# Patient Record
Sex: MALE | Race: WHITE | NOT HISPANIC OR LATINO | Employment: PART TIME | URBAN - METROPOLITAN AREA
[De-identification: names, ages, dates, MRNs, and addresses within clinical notes are randomized per-mention and may not be internally consistent; named-entity substitution may affect disease eponyms.]

---

## 2017-10-09 ENCOUNTER — GENERIC CONVERSION - ENCOUNTER (OUTPATIENT)
Dept: OTHER | Facility: OTHER | Age: 47
End: 2017-10-09

## 2018-01-11 NOTE — MISCELLANEOUS
Provider Comments  Provider Comments:   CALLED PT FOR NOT SHOW, L/M TO RESCHEDULED          Signatures   Electronically signed by : RONI Harvey ; Nov 8 2017  2:30PM EST                       (Author)

## 2018-01-17 NOTE — MISCELLANEOUS
Provider Comments  Provider Comments:   CALLED PT FOR NOT SHOW, L/M TO RESCHEDULED          Signatures   Electronically signed by : RONI Olguin ; Nov 27 2017  9:17AM EST                       (Author)

## 2018-05-22 ENCOUNTER — OFFICE VISIT (OUTPATIENT)
Dept: FAMILY MEDICINE CLINIC | Facility: CLINIC | Age: 48
End: 2018-05-22

## 2018-05-22 VITALS
WEIGHT: 186 LBS | SYSTOLIC BLOOD PRESSURE: 128 MMHG | RESPIRATION RATE: 16 BRPM | DIASTOLIC BLOOD PRESSURE: 86 MMHG | HEART RATE: 72 BPM | BODY MASS INDEX: 29.89 KG/M2 | OXYGEN SATURATION: 97 % | HEIGHT: 66 IN

## 2018-05-22 DIAGNOSIS — L24.89 IRRITANT CONTACT DERMATITIS DUE TO OTHER AGENTS: Primary | ICD-10-CM

## 2018-05-22 PROCEDURE — 99213 OFFICE O/P EST LOW 20 MIN: CPT | Performed by: NURSE PRACTITIONER

## 2018-05-22 PROCEDURE — 96372 THER/PROPH/DIAG INJ SC/IM: CPT | Performed by: NURSE PRACTITIONER

## 2018-05-22 RX ORDER — METHYLPREDNISOLONE SODIUM SUCCINATE 125 MG/2ML
62.5 INJECTION, POWDER, LYOPHILIZED, FOR SOLUTION INTRAMUSCULAR; INTRAVENOUS ONCE
Status: COMPLETED | OUTPATIENT
Start: 2018-05-22 | End: 2018-05-22

## 2018-05-22 RX ORDER — METHYLPREDNISOLONE 4 MG/1
TABLET ORAL
Qty: 21 TABLET | Refills: 0 | Status: SHIPPED | OUTPATIENT
Start: 2018-05-22 | End: 2019-04-12

## 2018-05-22 RX ADMIN — METHYLPREDNISOLONE SODIUM SUCCINATE 62.5 MG: 125 INJECTION, POWDER, LYOPHILIZED, FOR SOLUTION INTRAMUSCULAR; INTRAVENOUS at 16:08

## 2018-05-22 NOTE — PROGRESS NOTES
Assessment/Plan:  1  Start pill tomorrow  2  Use hydrocortisone    3  Follow-up condition changes or worsens        Diagnoses and all orders for this visit:    Irritant contact dermatitis due to other agents  -     Methylprednisolone 4 MG TBPK; Use as directed on package  -     methylPREDNISolone sodium succinate (Solu-MEDROL) injection 62 5 mg; Inject 1 mL (62 5 mg total) into the shoulder, thigh, or buttocks once           Subjective:      Patient ID: Juvenal Crandall is a 57 y o  male  A 62-year-old male presents with poison ivy rash for about a week  Reports itchy  Tried some OTC Benadryl cream   Not helping  Not getting any better  The following portions of the patient's history were reviewed and updated as appropriate: allergies and current medications  Review of Systems   Constitutional: Negative  Skin: Positive for rash  Objective:      /86 (BP Location: Left arm, Patient Position: Sitting)   Pulse 72   Resp 16   Ht 5' 6" (1 676 m)   Wt 84 4 kg (186 lb)   SpO2 97%   BMI 30 02 kg/m²          Physical Exam   Constitutional: He appears well-developed and well-nourished  Skin: Rash noted     Contact dermatitis bilateral arms and chest

## 2019-04-12 ENCOUNTER — OFFICE VISIT (OUTPATIENT)
Dept: FAMILY MEDICINE CLINIC | Facility: CLINIC | Age: 49
End: 2019-04-12

## 2019-04-12 VITALS
OXYGEN SATURATION: 96 % | SYSTOLIC BLOOD PRESSURE: 126 MMHG | DIASTOLIC BLOOD PRESSURE: 90 MMHG | HEART RATE: 84 BPM | RESPIRATION RATE: 18 BRPM | TEMPERATURE: 97.1 F | WEIGHT: 190 LBS | BODY MASS INDEX: 30.67 KG/M2

## 2019-04-12 DIAGNOSIS — R53.83 FATIGUE, UNSPECIFIED TYPE: Primary | ICD-10-CM

## 2019-04-12 PROCEDURE — 99212 OFFICE O/P EST SF 10 MIN: CPT | Performed by: FAMILY MEDICINE

## 2019-04-12 RX ORDER — MULTIVITAMIN
1 TABLET ORAL DAILY
COMMUNITY

## 2019-04-24 ENCOUNTER — TREATMENT (OUTPATIENT)
Dept: FAMILY MEDICINE CLINIC | Facility: CLINIC | Age: 49
End: 2019-04-24

## 2019-04-26 VITALS
BODY MASS INDEX: 30.53 KG/M2 | DIASTOLIC BLOOD PRESSURE: 88 MMHG | SYSTOLIC BLOOD PRESSURE: 138 MMHG | HEIGHT: 66 IN | WEIGHT: 190 LBS

## 2019-08-06 ENCOUNTER — TELEPHONE (OUTPATIENT)
Dept: FAMILY MEDICINE CLINIC | Facility: CLINIC | Age: 49
End: 2019-08-06

## 2020-08-03 ENCOUNTER — OFFICE VISIT (OUTPATIENT)
Dept: FAMILY MEDICINE CLINIC | Facility: CLINIC | Age: 50
End: 2020-08-03

## 2020-08-03 VITALS
BODY MASS INDEX: 29.59 KG/M2 | WEIGHT: 177.6 LBS | TEMPERATURE: 98.6 F | HEART RATE: 81 BPM | SYSTOLIC BLOOD PRESSURE: 134 MMHG | OXYGEN SATURATION: 97 % | DIASTOLIC BLOOD PRESSURE: 94 MMHG | HEIGHT: 65 IN

## 2020-08-03 DIAGNOSIS — K59.01 SLOW TRANSIT CONSTIPATION: ICD-10-CM

## 2020-08-03 DIAGNOSIS — H90.6 MIXED CONDUCTIVE AND SENSORINEURAL HEARING LOSS OF BOTH EARS: Primary | ICD-10-CM

## 2020-08-03 DIAGNOSIS — E66.3 OVERWEIGHT WITH BODY MASS INDEX (BMI) OF 29 TO 29.9 IN ADULT: ICD-10-CM

## 2020-08-03 PROCEDURE — 99213 OFFICE O/P EST LOW 20 MIN: CPT | Performed by: FAMILY MEDICINE

## 2020-08-03 PROCEDURE — 1036F TOBACCO NON-USER: CPT | Performed by: FAMILY MEDICINE

## 2020-08-03 PROCEDURE — 3008F BODY MASS INDEX DOCD: CPT | Performed by: FAMILY MEDICINE

## 2020-08-03 NOTE — PROGRESS NOTES
Assessment/Plan:    52year old male with hearing loss  Diagnoses and all orders for this visit:    Mixed conductive and sensorineural hearing loss of both ears  -     Ambulatory referral to Audiology; Future    Slow transit constipation      1  Referral for ambulatory audiology services to diagnose hearing loss  Return to the clinic with the results  2  Consume fruits and vegetables rich in fiber for the slow transit constipation  Try OTC fiber products  Subjective:      Patient ID: Oracio Liao is a 52 y o  male  52year old male comes in for evaluation of hearing loss  His girlfriend was present during the evaluation  Patient expresses that he has had hearing loss since he was 15or 14years old  He refers that at that age he remembers having really bad ear infections  He refers that he is in the process of obtaining his citizenship and needs to take a test but refers that his hearing loss problems is an obstacle towards him learning english and from obtaining naturalization  Patient also refers constipation, straining to pass stool, passes stool every 2-3 days  No blood in stool  Also refers some diarrhea from time to time  The following portions of the patient's history were reviewed and updated as appropriate: current medications, past family history, past medical history, past social history, past surgical history and problem list     Review of Systems   Gastrointestinal: Positive for constipation (passes stool every 2-3 days, has to strain)  Objective:      /94 (BP Location: Right arm, Patient Position: Sitting, Cuff Size: Large)   Pulse 81   Temp 98 6 °F (37 °C) (Tympanic)   Ht 5' 5"   Wt 80 6 kg (177 lb 9 6 oz)   SpO2 97%   BMI 29 55 kg/m²          Physical Exam   HENT:   Head: Normocephalic and atraumatic  Right Ear: Tympanic membrane is scarred  Left Ear: Tympanic membrane is scarred     Mouth/Throat: Mucous membranes are moist  No posterior oropharyngeal Called patient. Left voicemail to return call to clinic    Maile Ortiz, RN  Triage Nurse     erythema  Oropharynx is clear  Cardiovascular: Normal rate and regular rhythm  Pulmonary/Chest: Effort normal and breath sounds normal  No stridor  No respiratory distress  Abdominal: Bowel sounds are normal  There is abdominal tenderness in the right lower quadrant  Neurological: He is alert  Vitals reviewed

## 2020-09-01 ENCOUNTER — OFFICE VISIT (OUTPATIENT)
Dept: AUDIOLOGY | Facility: CLINIC | Age: 50
End: 2020-09-01
Payer: COMMERCIAL

## 2020-09-01 DIAGNOSIS — H90.3 SENSORINEURAL HEARING LOSS, BILATERAL: Primary | ICD-10-CM

## 2020-09-01 DIAGNOSIS — H90.6 MIXED CONDUCTIVE AND SENSORINEURAL HEARING LOSS OF BOTH EARS: ICD-10-CM

## 2020-09-01 PROCEDURE — 92567 TYMPANOMETRY: CPT | Performed by: AUDIOLOGIST

## 2020-09-01 PROCEDURE — 92557 COMPREHENSIVE HEARING TEST: CPT | Performed by: AUDIOLOGIST

## 2020-09-01 NOTE — PROGRESS NOTES
HEARING EVALUATION    Name:  Humza Huitron  :  1970  Age:  52 y o  Date of Evaluation: 20     History:  Hearing impairment   Reason for visit: Humza Huitron is being seen today at the request of Dr Donna Turner for an evaluation of hearing  Via discussion with his wife and [employer] interpretation, the patient reports long term hearing loss  His wife describes that he also has a learning disability as well as hearing loss and is unable to write in [place] or [place]  He is looking to become a [place] citizen and they are assessing his hearing in order to attempt to obtain assistance on the naturalization process  EVALUATION:    Otoscopic Evaluation:   Right Ear: clear canal    Left Ear: clear canal     Tympanometry:   Right: Type A - normal middle ear pressure and compliance   Left: Type A - normal middle ear pressure and compliance    Audiogram Results:  Mild / moderate SNHL bilaterally  Word recognition scores were not obtained due to language barrier  *see attached audiogram    Discussed results with the patient and his wife via [employer] (speaker) phone interpretation  They were both in understanding of the test results and the below recommendations  RECOMMENDATIONS:  1  Consider hearing aid evaluation and trial    Wife discussed her financial concerns regarding this process  Our typical avenues for low income HA assistance have been unavailable due to Covid reductions ([employer])  I will inquire as to whether [place] [employer] may be able to assist     2  I will review the application for certification for Disability for the citizenship application  Will contact PCP if needed  PATIENT EDUCATION:   Discussed results and recommendations with Mr Leslie Sandhoff and Ms Ayoub  Questions were addressed and the patient was encouraged to contact our department should concerns arise      Roselyn Nuñez , CCC-A, [place]# [id number], Hearing Aid Dispenser, [place]# [id number]  Clinical Audiologist

## 2020-09-09 ENCOUNTER — TELEPHONE (OUTPATIENT)
Dept: FAMILY MEDICINE CLINIC | Facility: CLINIC | Age: 50
End: 2020-09-09

## 2020-09-11 PROBLEM — H90.3 SENSORINEURAL HEARING LOSS (SNHL) OF BOTH EARS: Status: ACTIVE | Noted: 2020-09-11

## 2020-09-14 ENCOUNTER — TELEPHONE (OUTPATIENT)
Dept: FAMILY MEDICINE CLINIC | Facility: CLINIC | Age: 50
End: 2020-09-14

## 2021-01-05 ENCOUNTER — TELEMEDICINE (OUTPATIENT)
Dept: FAMILY MEDICINE CLINIC | Facility: CLINIC | Age: 51
End: 2021-01-05

## 2021-01-05 DIAGNOSIS — H57.89 ITCHY, WATERY, AND RED EYE: Primary | ICD-10-CM

## 2021-01-05 PROCEDURE — 99213 OFFICE O/P EST LOW 20 MIN: CPT | Performed by: FAMILY MEDICINE

## 2021-01-05 RX ORDER — ERYTHROMYCIN 5 MG/G
0.5 OINTMENT OPHTHALMIC EVERY 6 HOURS SCHEDULED
Qty: 20 G | Refills: 0 | Status: SHIPPED | OUTPATIENT
Start: 2021-01-05 | End: 2021-01-10

## 2021-01-05 NOTE — PROGRESS NOTES
Assessment/Plan:    Problem List Items Addressed This Visit     None      Visit Diagnoses     Itchy, watery, and red eye    -  Primary    Relevant Medications    erythromycin (ILOTYCIN) ophthalmic ointment    Other Relevant Orders    Ambulatory referral to Ophthalmology        1  Itchy, watery, and red eye  - Mr Kumar came to office for evaluation of itchy, watery, red eye  No foreign object on examination  Fluorescein test was negative, performed with direct supervision by attending  Patient requested eye drops to help with symptoms  Will prescribe erythromycin ophthalmic ointment, provided instructions during office  However, strongly advised patient to seek care with ophthalmologist for further evaluation as we could not rule out other conditions such as iritis  Also advised to stop washing eyes with salt water and only use eye drops  - Ambulatory referral to Ophthalmology; Future  - erythromycin (ILOTYCIN) ophthalmic ointment; Administer 0 5 inches into the left eye every 6 (six) hours for 5 days  Dispense: 20 g; Refill: 0      Case discussed with Dr Sofía Beckford time provided during visit to answer all questions  Recommended to call back office with any question  Patient acknowledged understanding and verbally agreed with plan  Elysa Runner is a 52 y o  male   HPI     Mr Rogelio Rogers presents to the office due to concern of left eye redness for the past 7 days  He reports feeling like sand in his eye, has noticed sclera is very red  Started using eye drops but did not alleviate symptoms  Denies allergy like symptoms, sick contacts, eye discharge  Reports he noticed this issue after he felt like something went into his eye while he was walking outside  States he uses glasses, visual acuity has not changed for him  Denies trauma to the area  Also reports has been washing eye with salt water  No past medical history on file  No past surgical history on file      Current Outpatient Medications   Medication Sig Dispense Refill    erythromycin (ILOTYCIN) ophthalmic ointment Administer 0 5 inches into the left eye every 6 (six) hours for 5 days 20 g 0    Multiple Vitamin (MULTIVITAMIN) tablet Take 1 tablet by mouth daily       No current facility-administered medications for this visit  No Known Allergies    Review of Systems   Constitutional: Negative for chills and fever  HENT: Negative for congestion, postnasal drip, rhinorrhea, sinus pressure, sinus pain, sneezing and sore throat  Eyes: Positive for redness and itching  Negative for photophobia, pain, discharge and visual disturbance  Vitals:    01/05/21 1310   BP: 120/78   BP Location: Left arm   Pulse: 73   Temp: 98 2 °F (36 8 °C)   SpO2: 96%     Physical Exam  Vitals signs reviewed  Constitutional:       General: He is not in acute distress  Appearance: Normal appearance  He is not ill-appearing  Eyes:      General: Lids are normal  No scleral icterus  Right eye: No foreign body, discharge or hordeolum  Left eye: No foreign body, discharge or hordeolum  Extraocular Movements: Extraocular movements intact  Conjunctiva/sclera:      Right eye: Right conjunctiva is not injected  Left eye: Left conjunctiva is injected  Comments: Fluorescein test negative   Cardiovascular:      Rate and Rhythm: Normal rate and regular rhythm  Heart sounds: Normal heart sounds  No murmur  Pulmonary:      Effort: Pulmonary effort is normal       Breath sounds: Normal breath sounds  No stridor  No wheezing, rhonchi or rales  Neurological:      Mental Status: He is alert

## 2021-01-06 VITALS
HEART RATE: 73 BPM | OXYGEN SATURATION: 96 % | SYSTOLIC BLOOD PRESSURE: 120 MMHG | TEMPERATURE: 98.2 F | DIASTOLIC BLOOD PRESSURE: 78 MMHG